# Patient Record
Sex: FEMALE | Race: OTHER | NOT HISPANIC OR LATINO
[De-identification: names, ages, dates, MRNs, and addresses within clinical notes are randomized per-mention and may not be internally consistent; named-entity substitution may affect disease eponyms.]

---

## 2022-03-10 ENCOUNTER — NON-APPOINTMENT (OUTPATIENT)
Age: 62
End: 2022-03-10

## 2022-03-24 PROBLEM — Z00.00 ENCOUNTER FOR PREVENTIVE HEALTH EXAMINATION: Status: ACTIVE | Noted: 2022-03-24

## 2022-04-05 DIAGNOSIS — M25.561 PAIN IN RIGHT KNEE: ICD-10-CM

## 2022-04-05 DIAGNOSIS — M25.562 PAIN IN RIGHT KNEE: ICD-10-CM

## 2022-04-06 ENCOUNTER — APPOINTMENT (OUTPATIENT)
Dept: ORTHOPEDIC SURGERY | Facility: CLINIC | Age: 62
End: 2022-04-06
Payer: COMMERCIAL

## 2022-04-06 ENCOUNTER — NON-APPOINTMENT (OUTPATIENT)
Age: 62
End: 2022-04-06

## 2022-04-06 VITALS — BODY MASS INDEX: 30.61 KG/M2 | HEIGHT: 67 IN | WEIGHT: 195 LBS

## 2022-04-06 PROCEDURE — 73564 X-RAY EXAM KNEE 4 OR MORE: CPT | Mod: LT

## 2022-04-06 PROCEDURE — 99203 OFFICE O/P NEW LOW 30 MIN: CPT

## 2022-04-06 NOTE — ADDENDUM
[FreeTextEntry1] : This note was written by Meghan Alarcon on 04/06/2022 acting as scribe for Dr. Erich Gramajo M.D.\par \par I, Dr. Erich Gramajo, have read and attest that all the information, medical decision making and discharge instructions within are true and accurate.

## 2022-04-06 NOTE — DISCUSSION/SUMMARY
[de-identified] : Discussed at length the nature of the patient’s condition. Their bilateral knee symptoms appear secondary to mild degenerative arthritis. Pt states that her symptoms have been improving. I reviewed her xray films with her. I suggested home exercise and to continue with ibuprofen. Patient can continue activities as tolerated. All questions answered, understanding verbalized. Patient in agreement with plan of care.\par \par I did refer her to Dr. Baker for evaluation of her left foot and ankle.

## 2022-04-06 NOTE — HISTORY OF PRESENT ILLNESS
[de-identified] : ALVARADO TUCKER is a 61 year old female who presents for initial evaluation of bilateral knee pain, worse on the right. Pt states that it started about 2 months ago on the right side, no injury. Pain is diffuse and she noticed some swelling. Denies any mechanical symptoms. The pain on the left has started in the last couple of weeks. Takes ibuprofen prn with relief. States the pain had gotten significantly better. Denies any prior surgery, PT or injections. NKDA. Allergic to Latex.

## 2022-04-06 NOTE — PHYSICAL EXAM
[de-identified] : General appearance: well nourished and hydrated, pleasant, alert and oriented x 3, cooperative.\par HEENT: Normocephalic, EOM intact, Nasal septum midline, Oral cavity clear, External auditory canal clear.\par Cardiovascular: no apparent abnormalities, no lower leg edema, no varicosities, pedal pulses are palpable.\par Lymphatics Lymph nodes: none palpated, Lymphedema: not present.\par Neurologic: sensation is normal, no muscle weakness in upper or lower extremities, patella tendon reflexes intact .\par Dermatologic no apparent skin lesions, moist, warm, no rash.\par Spine:cervical spine appears normal and moves freely, thoracic spine appears normal and moves freely, lumbosacral spine appears normal and moves freely.\par Gait: nonantalgic.\par \par Left knee\par Inspection: no effusion or erythema.\par Wounds: none.\par Alignment: normal.\par Palpation: no specific tenderness on palpation.\par ROM active (in degrees): 0-135 with mild patellofemoral clicking\par Ligamentous laxity: all ligaments appear stable,, negative ant. drawer test, negative post. drawer test, stable to varus stress test, stable to valgus stress test. negative Lachman's test, negative pivot shift test\par Meniscal Test: negative McMurrays, negative Lino.\par Patellofemoral Alignment Test: Q angle-, normal.\par Muscle Test: good quad strength.\par \par Right knee\par Inspection: no effusion or erythema.\par Wounds: none.\par Alignment: normal.\par Palpation: no specific tenderness on palpation.\par ROM active (in degrees): 0-135 with mild patellofemoral clicking\par Ligamentous laxity: all ligaments appear stable,, negative ant. drawer test, negative post. drawer test, stable to varus stress test, stable to valgus stress test. negative Lachman's test, negative pivot shift test\par Meniscal Test: negative McMurrays, negative Lino.\par Patellofemoral Alignment Test: Q angle-, normal.\par Muscle Test: good quad strength.\par \par Left hip\par Inspection: No swelling or ecchymosis.\par Wounds: none.\par Palpation: non-tender.\par Stability: no instability.\par Strength: 5/5 all motor groups.\par ROM: no pain with FROM.\par Leg length: equal.\par \par Right hip\par Inspection: No swelling or ecchymosis.\par Wounds: none.\par Palpation: non-tender.\par Stability: no instability.\par Strength: 5/5 all motor groups.\par ROM: no pain with FROM.\par Leg length: equal.\par \par Left ankle\par Inspection: no erythema noted, no swelling noted.\par Palpation: no pain on palpation .\par ROM: FROM without crepitus.\par Muscle strength: 5/5.\par Stability: no instability noted.\par \par Right ankle\par Inspection: no erythema noted, no swelling noted.\par ROM: FROM without crepitus.\par Palpation: no pain on palpation .\par Muscle strength: 5/5.\par Stability: no instability noted.\par \par Left foot\par Inspection: color, texture and turgor are normal.\par ROM: full range of motion of all joints without pain or crepitus.\par Palpation: no tenderness.\par Stability: no instability noted.\par \par Right foot\par Inspection: color, texture and turgor are normal.\par ROM: full range of motion of all joints without pain or crepitus.\par Palpation: no tenderness.\par Stability: no instability noted.\par \par Left shoulder\par Inspection: no muscle asymmetry, no atrophy.\par Palpation: no tenderness noted, ACJ non-tender.\par ROM: full active ROM, full passive ROM.\par Strength testing): anterior deltoid, supraspinatus, infraspinatus, subscapularis all 5/5.\par Stability test: ant. apprehension negative, post. apprehension negative, relocation test negative.\par Impingement Test: negative NEER.\par \par Right shoulder\par Inspection: no muscle asymmetry, no atrophy.\par Palpation: no tenderness noted, ACJ non-tender.\par ROM: full active ROM, full passive ROM.\par Strength testing): anterior deltoid, supraspinatus, infraspinatus, subscapularis all 5/5.\par Stability test: ant. apprehension negative, post. apprehension negative, relocation test negative.\par Impingement Test: negative NEER.\par Surgical Wounds: none.\par \par Left elbow\par Inspection: negative swelling.\par Wounds: none.\par Palpation: non-tender.\par ROM: full ROM.\par Strength: 5/5 all groups.\par Stability: no instability.\par Mass: none.\par \par Right elbow\par Inspection: negative swelling.\par Wounds: none.\par Palpation: non-tender.\par ROM: full ROM.\par Strength: 5/5 all groups.\par Stability: no instability.\par Mass: none.\par \par Left wrist\par Inspection: negative swelling.\par Wound: none.\par Palpation (bone): no tenderness.\par ROM: full ROM.\par Strength: full , good.\par \par Right wrist\par Inspection: negative swelling.\par Wound: none.\par Palpation (bone): no tenderness.\par ROM: full ROM.\par Strength: full , good.\par \par Left hand\par Inspection: no skin changes, normal appearance.\par Wounds: none.\par Strength: full , able to make full fist.\par Sensation: light touch intact all fingers and thumb.\par Vascular: good capillary refill < 3 seconds, all fingers and thumb.\par Mass: none.\par \par Right hand\par Inspection: no skin changes, normal appearance.\par Wounds: none.\par Strength: full , able to make full fist.\par Sensation: light touch intact all fingers and thumb.\par Vascular: good capillary refill < 3 seconds, all fingers and thumb.\par Mass: none. [de-identified] : Right knee xrays, standing AP/Lateral and Merchant films, and 45 degree PA standing view, taken at the office today shows normal alignment, degenerative arthritis, decreased medial joint space height, Kellgren and Corey grade 1\par \par Left knee xrays, standing AP/Lateral and Merchant films, and 45 degree PA standing view, taken at the office today shows normal alignment, degenerative arthritis, decreased medial joint space height, Kellgren and Corey grade 1

## 2022-04-08 ENCOUNTER — APPOINTMENT (OUTPATIENT)
Dept: ORTHOPEDIC SURGERY | Facility: CLINIC | Age: 62
End: 2022-04-08
Payer: COMMERCIAL

## 2022-04-08 VITALS — BODY MASS INDEX: 30.61 KG/M2 | WEIGHT: 195 LBS | HEIGHT: 67 IN

## 2022-04-08 PROCEDURE — 73600 X-RAY EXAM OF ANKLE: CPT | Mod: LT

## 2022-04-08 PROCEDURE — 73630 X-RAY EXAM OF FOOT: CPT | Mod: LT

## 2022-04-08 PROCEDURE — 99214 OFFICE O/P EST MOD 30 MIN: CPT

## 2022-09-13 ENCOUNTER — APPOINTMENT (OUTPATIENT)
Dept: ORTHOPEDIC SURGERY | Facility: CLINIC | Age: 62
End: 2022-09-13

## 2022-09-13 ENCOUNTER — TRANSCRIPTION ENCOUNTER (OUTPATIENT)
Age: 62
End: 2022-09-13

## 2022-09-13 VITALS — HEIGHT: 67 IN | WEIGHT: 190 LBS | BODY MASS INDEX: 29.82 KG/M2

## 2022-09-13 DIAGNOSIS — M62.89 OTHER SPECIFIED DISORDERS OF MUSCLE: ICD-10-CM

## 2022-09-13 PROCEDURE — 99213 OFFICE O/P EST LOW 20 MIN: CPT

## 2022-10-07 ENCOUNTER — APPOINTMENT (OUTPATIENT)
Dept: MRI IMAGING | Facility: CLINIC | Age: 62
End: 2022-10-07

## 2022-10-07 ENCOUNTER — OUTPATIENT (OUTPATIENT)
Dept: OUTPATIENT SERVICES | Facility: HOSPITAL | Age: 62
LOS: 1 days | End: 2022-10-07
Payer: COMMERCIAL

## 2022-10-07 DIAGNOSIS — M62.89 OTHER SPECIFIED DISORDERS OF MUSCLE: ICD-10-CM

## 2022-10-07 PROCEDURE — 73721 MRI JNT OF LWR EXTRE W/O DYE: CPT

## 2022-10-07 PROCEDURE — 73721 MRI JNT OF LWR EXTRE W/O DYE: CPT | Mod: 26,LT

## 2022-10-27 DIAGNOSIS — S99.919A UNSPECIFIED INJURY OF UNSPECIFIED ANKLE, INITIAL ENCOUNTER: ICD-10-CM

## 2022-10-28 ENCOUNTER — APPOINTMENT (OUTPATIENT)
Dept: ORTHOPEDIC SURGERY | Facility: CLINIC | Age: 62
End: 2022-10-28

## 2022-10-28 VITALS — WEIGHT: 190 LBS | BODY MASS INDEX: 29.82 KG/M2 | HEIGHT: 67 IN

## 2022-10-28 PROCEDURE — 99214 OFFICE O/P EST MOD 30 MIN: CPT

## 2022-10-28 PROCEDURE — 73610 X-RAY EXAM OF ANKLE: CPT | Mod: LT

## 2022-10-30 ENCOUNTER — NON-APPOINTMENT (OUTPATIENT)
Age: 62
End: 2022-10-30

## 2022-11-01 ENCOUNTER — OUTPATIENT (OUTPATIENT)
Dept: OUTPATIENT SERVICES | Facility: HOSPITAL | Age: 62
LOS: 1 days | End: 2022-11-01

## 2022-11-01 ENCOUNTER — APPOINTMENT (OUTPATIENT)
Dept: ULTRASOUND IMAGING | Facility: CLINIC | Age: 62
End: 2022-11-01

## 2022-11-01 ENCOUNTER — NON-APPOINTMENT (OUTPATIENT)
Age: 62
End: 2022-11-01

## 2022-11-01 VITALS
HEART RATE: 68 BPM | WEIGHT: 190.04 LBS | TEMPERATURE: 97 F | DIASTOLIC BLOOD PRESSURE: 68 MMHG | HEIGHT: 66.5 IN | OXYGEN SATURATION: 97 % | SYSTOLIC BLOOD PRESSURE: 104 MMHG | RESPIRATION RATE: 16 BRPM

## 2022-11-01 DIAGNOSIS — Z98.890 OTHER SPECIFIED POSTPROCEDURAL STATES: Chronic | ICD-10-CM

## 2022-11-01 DIAGNOSIS — G47.33 OBSTRUCTIVE SLEEP APNEA (ADULT) (PEDIATRIC): ICD-10-CM

## 2022-11-01 DIAGNOSIS — S82.892A OTHER FRACTURE OF LEFT LOWER LEG, INITIAL ENCOUNTER FOR CLOSED FRACTURE: ICD-10-CM

## 2022-11-01 DIAGNOSIS — Z98.891 HISTORY OF UTERINE SCAR FROM PREVIOUS SURGERY: Chronic | ICD-10-CM

## 2022-11-01 LAB
ANION GAP SERPL CALC-SCNC: 12 MMOL/L — SIGNIFICANT CHANGE UP (ref 7–14)
BUN SERPL-MCNC: 21 MG/DL — SIGNIFICANT CHANGE UP (ref 7–23)
CALCIUM SERPL-MCNC: 9.3 MG/DL — SIGNIFICANT CHANGE UP (ref 8.4–10.5)
CHLORIDE SERPL-SCNC: 104 MMOL/L — SIGNIFICANT CHANGE UP (ref 98–107)
CO2 SERPL-SCNC: 24 MMOL/L — SIGNIFICANT CHANGE UP (ref 22–31)
CREAT SERPL-MCNC: 0.94 MG/DL — SIGNIFICANT CHANGE UP (ref 0.5–1.3)
EGFR: 69 ML/MIN/1.73M2 — SIGNIFICANT CHANGE UP
GLUCOSE SERPL-MCNC: 93 MG/DL — SIGNIFICANT CHANGE UP (ref 70–99)
HCT VFR BLD CALC: 37.3 % — SIGNIFICANT CHANGE UP (ref 34.5–45)
HGB BLD-MCNC: 12.2 G/DL — SIGNIFICANT CHANGE UP (ref 11.5–15.5)
MCHC RBC-ENTMCNC: 29.5 PG — SIGNIFICANT CHANGE UP (ref 27–34)
MCHC RBC-ENTMCNC: 32.7 GM/DL — SIGNIFICANT CHANGE UP (ref 32–36)
MCV RBC AUTO: 90.3 FL — SIGNIFICANT CHANGE UP (ref 80–100)
NRBC # BLD: 0 /100 WBCS — SIGNIFICANT CHANGE UP (ref 0–0)
NRBC # FLD: 0 K/UL — SIGNIFICANT CHANGE UP (ref 0–0)
PLATELET # BLD AUTO: 293 K/UL — SIGNIFICANT CHANGE UP (ref 150–400)
POTASSIUM SERPL-MCNC: 4.3 MMOL/L — SIGNIFICANT CHANGE UP (ref 3.5–5.3)
POTASSIUM SERPL-SCNC: 4.3 MMOL/L — SIGNIFICANT CHANGE UP (ref 3.5–5.3)
RBC # BLD: 4.13 M/UL — SIGNIFICANT CHANGE UP (ref 3.8–5.2)
RBC # FLD: 13.6 % — SIGNIFICANT CHANGE UP (ref 10.3–14.5)
SODIUM SERPL-SCNC: 140 MMOL/L — SIGNIFICANT CHANGE UP (ref 135–145)
WBC # BLD: 7.41 K/UL — SIGNIFICANT CHANGE UP (ref 3.8–10.5)
WBC # FLD AUTO: 7.41 K/UL — SIGNIFICANT CHANGE UP (ref 3.8–10.5)

## 2022-11-01 PROCEDURE — 93010 ELECTROCARDIOGRAM REPORT: CPT

## 2022-11-01 PROCEDURE — 93971 EXTREMITY STUDY: CPT | Mod: LT

## 2022-11-01 RX ORDER — SODIUM CHLORIDE 9 MG/ML
1000 INJECTION, SOLUTION INTRAVENOUS
Refills: 0 | Status: DISCONTINUED | OUTPATIENT
Start: 2022-11-03 | End: 2022-11-17

## 2022-11-01 NOTE — H&P PST ADULT - ATTENDING COMMENTS
Patient examined in holding area without KI which she mentioned was discontinued by knee specialist.

## 2022-11-01 NOTE — H&P PST ADULT - MUSCULOSKELETAL COMMENTS
left ankle fracture and dislocated patella left ankle fracture and dislocated patella.  Extremity: STS / tenderness L ankle, skin intact L LE

## 2022-11-01 NOTE — H&P PST ADULT - HISTORY OF PRESENT ILLNESS
62 y/o female PMH migraine headaches and mild CURTIS presents to presurgical testing with diagnosis of other fracture of left lower leg initial encounter scheduled for open reduction internal fixation left ankle fracture potential tight syndesmotic repair. Pt sustained a left ankle fracture and dislocated her patella after a fall.

## 2022-11-01 NOTE — H&P PST ADULT - DP PULSE
How Severe Is Your Skin Lesion?: moderate Has Your Skin Lesion Been Treated?: not been treated Is This A New Presentation, Or A Follow-Up?: Skin Lesion Is This A New Presentation, Or A Follow-Up?: Growth left unable to asses due to splint/right normal

## 2022-11-01 NOTE — H&P PST ADULT - PROBLEM SELECTOR PLAN 1
Patient tentatively scheduled for open reduction internal fixation left ankle fracture potential tight syndesmotic repair for 11/3/22. Pre-op instructions provided. Pt given verbal and written instructions with teach back on chlorhexidine shampoo and pepcid. Pt verbalized understanding with return demonstration.     COVID-19 PCR non detected - result 10/31/22 in chart    Pending copy of echo and stress  test from 2021.

## 2022-11-01 NOTE — H&P PST ADULT - MUSCULOSKELETAL
left lower leg with cast and splint, uses crutches/decreased ROM due to pain/strength 5/5 bilateral upper extremities/extremities exam/decreased strength/abnormal gait details…

## 2022-11-02 ENCOUNTER — TRANSCRIPTION ENCOUNTER (OUTPATIENT)
Age: 62
End: 2022-11-02

## 2022-11-02 ENCOUNTER — APPOINTMENT (OUTPATIENT)
Dept: ORTHOPEDIC SURGERY | Facility: CLINIC | Age: 62
End: 2022-11-02

## 2022-11-02 VITALS — BODY MASS INDEX: 29.82 KG/M2 | WEIGHT: 190 LBS | HEIGHT: 67 IN

## 2022-11-02 PROBLEM — Z87.81 PERSONAL HISTORY OF (HEALED) TRAUMATIC FRACTURE: Chronic | Status: ACTIVE | Noted: 2022-11-01

## 2022-11-02 PROBLEM — G47.33 OBSTRUCTIVE SLEEP APNEA (ADULT) (PEDIATRIC): Chronic | Status: ACTIVE | Noted: 2022-11-01

## 2022-11-02 PROBLEM — G43.909 MIGRAINE, UNSPECIFIED, NOT INTRACTABLE, WITHOUT STATUS MIGRAINOSUS: Chronic | Status: ACTIVE | Noted: 2022-11-01

## 2022-11-02 PROCEDURE — 99214 OFFICE O/P EST MOD 30 MIN: CPT

## 2022-11-02 PROCEDURE — 73560 X-RAY EXAM OF KNEE 1 OR 2: CPT | Mod: 50

## 2022-11-02 NOTE — ASU PATIENT PROFILE, ADULT - FALL HARM RISK - HARM RISK INTERVENTIONS

## 2022-11-03 ENCOUNTER — OUTPATIENT (OUTPATIENT)
Dept: OUTPATIENT SERVICES | Facility: HOSPITAL | Age: 62
LOS: 1 days | Discharge: HOME | End: 2022-11-03

## 2022-11-03 ENCOUNTER — APPOINTMENT (OUTPATIENT)
Dept: ORTHOPEDIC SURGERY | Facility: HOSPITAL | Age: 62
End: 2022-11-03

## 2022-11-03 ENCOUNTER — TRANSCRIPTION ENCOUNTER (OUTPATIENT)
Age: 62
End: 2022-11-03

## 2022-11-03 VITALS
OXYGEN SATURATION: 98 % | HEART RATE: 73 BPM | RESPIRATION RATE: 18 BRPM | DIASTOLIC BLOOD PRESSURE: 55 MMHG | TEMPERATURE: 98 F | SYSTOLIC BLOOD PRESSURE: 124 MMHG

## 2022-11-03 VITALS
TEMPERATURE: 98 F | HEIGHT: 66.5 IN | OXYGEN SATURATION: 97 % | SYSTOLIC BLOOD PRESSURE: 126 MMHG | WEIGHT: 190.04 LBS | RESPIRATION RATE: 16 BRPM | HEART RATE: 66 BPM | DIASTOLIC BLOOD PRESSURE: 67 MMHG

## 2022-11-03 DIAGNOSIS — Z98.890 OTHER SPECIFIED POSTPROCEDURAL STATES: Chronic | ICD-10-CM

## 2022-11-03 DIAGNOSIS — S82.892A OTHER FRACTURE OF LEFT LOWER LEG, INITIAL ENCOUNTER FOR CLOSED FRACTURE: ICD-10-CM

## 2022-11-03 DIAGNOSIS — Z98.891 HISTORY OF UTERINE SCAR FROM PREVIOUS SURGERY: Chronic | ICD-10-CM

## 2022-11-03 PROCEDURE — 27814 TREATMENT OF ANKLE FRACTURE: CPT | Mod: LT

## 2022-11-03 DEVICE — SCREW LOGK 3.5X22MM: Type: IMPLANTABLE DEVICE | Status: FUNCTIONAL

## 2022-11-03 DEVICE — SCREW LOKG 3.5X14MM: Type: IMPLANTABLE DEVICE | Status: FUNCTIONAL

## 2022-11-03 DEVICE — IMPLANTABLE DEVICE: Type: IMPLANTABLE DEVICE | Status: FUNCTIONAL

## 2022-11-03 DEVICE — K-WIRE WRIGHT MEDICAL (SINGLE TROCAR) 1.6MM X 150MM: Type: IMPLANTABLE DEVICE | Status: FUNCTIONAL

## 2022-11-03 DEVICE — SCREW LOKG 3.5X12MM: Type: IMPLANTABLE DEVICE | Status: FUNCTIONAL

## 2022-11-03 DEVICE — SCREW LOKG 3.5X32MM: Type: IMPLANTABLE DEVICE | Status: FUNCTIONAL

## 2022-11-03 DEVICE — SCREW LCKG 3.5X24MM: Type: IMPLANTABLE DEVICE | Status: FUNCTIONAL

## 2022-11-03 DEVICE — SCREW CANC 4X30MM: Type: IMPLANTABLE DEVICE | Status: FUNCTIONAL

## 2022-11-03 DEVICE — SCREW LOKG 3.5X30MM: Type: IMPLANTABLE DEVICE | Status: FUNCTIONAL

## 2022-11-03 DEVICE — SCREW CORT LO PROF 3.5X14MM: Type: IMPLANTABLE DEVICE | Status: FUNCTIONAL

## 2022-11-03 DEVICE — PIN FIXATION TEMP 1.1MM SM: Type: IMPLANTABLE DEVICE | Status: FUNCTIONAL

## 2022-11-03 RX ORDER — ASPIRIN/CALCIUM CARB/MAGNESIUM 324 MG
1 TABLET ORAL
Qty: 28 | Refills: 0
Start: 2022-11-03 | End: 2022-11-30

## 2022-11-03 RX ORDER — OXYCODONE HYDROCHLORIDE 5 MG/1
5 TABLET ORAL ONCE
Refills: 0 | Status: DISCONTINUED | OUTPATIENT
Start: 2022-11-03 | End: 2022-11-03

## 2022-11-03 RX ORDER — OXYCODONE HYDROCHLORIDE 5 MG/1
5 TABLET ORAL ONCE
Refills: 0 | Status: DISCONTINUED | OUTPATIENT
Start: 2022-11-03 | End: 2022-11-17

## 2022-11-03 RX ORDER — ONDANSETRON 8 MG/1
4 TABLET, FILM COATED ORAL ONCE
Refills: 0 | Status: COMPLETED | OUTPATIENT
Start: 2022-11-03 | End: 2022-11-03

## 2022-11-03 RX ORDER — FENTANYL CITRATE 50 UG/ML
50 INJECTION INTRAVENOUS
Refills: 0 | Status: DISCONTINUED | OUTPATIENT
Start: 2022-11-03 | End: 2022-11-03

## 2022-11-03 RX ORDER — OXYCODONE HYDROCHLORIDE 5 MG/1
1 TABLET ORAL
Qty: 0 | Refills: 0 | DISCHARGE

## 2022-11-03 RX ORDER — IBUPROFEN 200 MG
1 TABLET ORAL
Qty: 0 | Refills: 0 | DISCHARGE

## 2022-11-03 RX ORDER — HYDROMORPHONE HYDROCHLORIDE 2 MG/ML
0.5 INJECTION INTRAMUSCULAR; INTRAVENOUS; SUBCUTANEOUS
Refills: 0 | Status: DISCONTINUED | OUTPATIENT
Start: 2022-11-03 | End: 2022-11-03

## 2022-11-03 RX ORDER — KETOROLAC TROMETHAMINE 30 MG/ML
30 SYRINGE (ML) INJECTION ONCE
Refills: 0 | Status: DISCONTINUED | OUTPATIENT
Start: 2022-11-03 | End: 2022-11-03

## 2022-11-03 RX ADMIN — Medication 30 MILLIGRAM(S): at 17:43

## 2022-11-03 RX ADMIN — OXYCODONE HYDROCHLORIDE 5 MILLIGRAM(S): 5 TABLET ORAL at 17:32

## 2022-11-03 RX ADMIN — HYDROMORPHONE HYDROCHLORIDE 0.5 MILLIGRAM(S): 2 INJECTION INTRAMUSCULAR; INTRAVENOUS; SUBCUTANEOUS at 15:46

## 2022-11-03 RX ADMIN — HYDROMORPHONE HYDROCHLORIDE 0.5 MILLIGRAM(S): 2 INJECTION INTRAMUSCULAR; INTRAVENOUS; SUBCUTANEOUS at 16:38

## 2022-11-03 RX ADMIN — HYDROMORPHONE HYDROCHLORIDE 0.5 MILLIGRAM(S): 2 INJECTION INTRAMUSCULAR; INTRAVENOUS; SUBCUTANEOUS at 16:15

## 2022-11-03 RX ADMIN — HYDROMORPHONE HYDROCHLORIDE 0.5 MILLIGRAM(S): 2 INJECTION INTRAMUSCULAR; INTRAVENOUS; SUBCUTANEOUS at 17:30

## 2022-11-03 RX ADMIN — HYDROMORPHONE HYDROCHLORIDE 0.5 MILLIGRAM(S): 2 INJECTION INTRAMUSCULAR; INTRAVENOUS; SUBCUTANEOUS at 18:00

## 2022-11-03 RX ADMIN — OXYCODONE HYDROCHLORIDE 5 MILLIGRAM(S): 5 TABLET ORAL at 18:15

## 2022-11-03 RX ADMIN — ONDANSETRON 4 MILLIGRAM(S): 8 TABLET, FILM COATED ORAL at 17:31

## 2022-11-03 RX ADMIN — Medication 30 MILLIGRAM(S): at 18:00

## 2022-11-03 NOTE — ASU PREOP CHECKLIST - SITE MARKED BY ANESTHESIOLOGIST
- Apply cream to affected area as prescribed, do not use steroid cream on skin continuously for more than 2 week as this may thin out the skin  - Make sure to cover entire body surface with non-perfumed lotion such as aveeno or eucerin cream within 5 minutes of getting out of the shower  - Avoid harsh perfumed soaps and detergents   - Avoid scratching skin. If skin extremely itchy may take benadryl or use cool compress to affected area  - Follow up with primary care provider if no improvement in symptoms   - Go to the emergency department for any worsening or concerning symptoms            
n/a

## 2022-11-03 NOTE — ASU DISCHARGE PLAN (ADULT/PEDIATRIC) - CALL YOUR DOCTOR IF YOU HAVE ANY OF THE FOLLOWING:
Bleeding that does not stop/Swelling that gets worse/Pain not relieved by Medications Bleeding that does not stop/Swelling that gets worse/Pain not relieved by Medications/Nausea and vomiting that does not stop/Inability to tolerate liquids or foods

## 2022-11-03 NOTE — ASU DISCHARGE PLAN (ADULT/PEDIATRIC) - FOLLOW UP APPOINTMENTS
472 May also call Recovery Room (PACU) 24/7 @ [phone]/[facility]

## 2022-11-03 NOTE — ASU DISCHARGE PLAN (ADULT/PEDIATRIC) - CARE PROVIDER_API CALL
Ronny Baker  Orthopedic Surgery  [address]  [place] [address]  Phone: [phone]  Fax: [phone]  Follow Up Time: 2 weeks

## 2022-11-03 NOTE — ASU DISCHARGE PLAN (ADULT/PEDIATRIC) - NS MD DC FALL RISK RISK
For information on Fall & Injury Prevention, visit: [email or web address] OR  [email or web address] OR  https://www.cdc.gov/steadi/patient.html

## 2022-11-03 NOTE — ASU DISCHARGE PLAN (ADULT/PEDIATRIC) - NURSING INSTRUCTIONS
You were given 1000mg IV Tylenol for pain management.  Please DO NOT take any Tylenol containing products, such as  Vicodin, Percocet, Excedrin, many cold preparations for the next 6 hours (until  __9_ PM).  DO NOT EXCEED 4000MG OF TYLENOL OVER 24 HOURS.     DO NOT take any Ibuprofen ,Advil or Aleeve until after  __1:45__AM . You received Toradol during your operation and it can cause damage if too much is taken within a 24 hour time period.

## 2022-11-03 NOTE — ASU DISCHARGE PLAN (ADULT/PEDIATRIC) - ASU DC SPECIAL INSTRUCTIONSFT
You will take pain medication as prescribed Narcotic pain medicine can cause extreme nausea and constipation. Drink plenty of water and take diuretics (colace, Miralax) as needed. You can get them from your local pharmacy.     You will take Aspirin 325mg once a day.     You will remain non weight bearing of your left lower extremity in a cast. You will remain in a cast until you follow up with Dr. Baker    You will follow up with Dr Baker in 2 weeks You will take pain medication as prescribed Narcotic pain medicine can cause extreme nausea and constipation. Drink plenty of water and take stool softeners (colace, Miralax) as needed. You can get them from your local pharmacy.     You will take Aspirin 325mg once a day.     You will remain non weight bearing of your left lower extremity in a cast. You will remain in a cast until you follow up with Dr. Baker    You will follow up with Dr Baker in 2 weeks

## 2022-11-03 NOTE — BRIEF OPERATIVE NOTE - NSICDXBRIEFPROCEDURE_GEN_ALL_CORE_FT
PROCEDURES:  Open reduction and internal fixation (ORIF) of bimalleolar fracture of left ankle 03-Nov-2022 15:57:04  Toni Davis

## 2022-11-12 LAB — SARS-COV-2 N GENE NPH QL NAA+PROBE: NOT DETECTED

## 2022-11-16 ENCOUNTER — NON-APPOINTMENT (OUTPATIENT)
Age: 62
End: 2022-11-16

## 2022-11-16 ENCOUNTER — APPOINTMENT (OUTPATIENT)
Dept: ORTHOPEDIC SURGERY | Facility: CLINIC | Age: 62
End: 2022-11-16

## 2022-11-16 PROCEDURE — 73610 X-RAY EXAM OF ANKLE: CPT | Mod: LT

## 2022-11-16 PROCEDURE — 99024 POSTOP FOLLOW-UP VISIT: CPT

## 2022-11-21 ENCOUNTER — APPOINTMENT (OUTPATIENT)
Dept: ORTHOPEDIC SURGERY | Facility: CLINIC | Age: 62
End: 2022-11-21

## 2022-11-21 PROCEDURE — 99213 OFFICE O/P EST LOW 20 MIN: CPT

## 2022-11-21 NOTE — DISCUSSION/SUMMARY
[de-identified] : Left knee patella dislocation still with pain, have ordered a MRI left knee to assess the retinaculum and see ifg there are any other associated injuries. At this time cannot start PT since she is recovering from left foot surgery and is NWB in CAM boot, she states that she mat start PT in 2 weeks if cleared by Dr Baker\titi Once results available will discuss further RASH/ITCH

## 2022-11-21 NOTE — PHYSICAL EXAM
[de-identified] : Left knee\par Inspection: trace effusion.\par Wounds: none.\par Alignment: normal.\par Palpation: medial retinaculum tenderness. medial and latreal joint pain \par ROM active (in degrees): 0-120 with pain at extremes, patella tracking in central position, mild patella apprehension with lateral deviation. \par Ligamentous laxity: all ligaments appear stable, negative Lachman's test, negative ant. drawer test, negative post. drawer test, negative pivot shift test, stable to varus stress test, stable to valgus stress test.\par Meniscal Test: negative McMurrays, negative Lino.\par Patellofemoral Alignment Test: Q angle-, normal.\par Muscle Test: good quad strength.\par \par Presented in a wheelchair today with left CAM walker boot, nonweight bearing

## 2022-11-21 NOTE — HISTORY OF PRESENT ILLNESS
[de-identified] : ALVARADO TUCKER is a 62 year old female who presents for follow up evaluation s/p left knee patella subluxation. Patient underwent surgery on 11/3/22 with Dr Baker for an ORIF of her left ankle fracture. Patient saw Dr Baker for her post-operative visit last week and was switched from a short leg cast to a Cam boot. Patient is also non-weight bearing and continues to use her wheelchair or crutches. Patient was recommended to use a rolling scooter but would like a follow-up evaluation for the left knee to determine if this is safe. Patient continues to experience pain and swelling of the left knee. She has been taking Percocet as needed post operatively for pain.

## 2022-11-30 ENCOUNTER — APPOINTMENT (OUTPATIENT)
Dept: ORTHOPEDIC SURGERY | Facility: CLINIC | Age: 62
End: 2022-11-30

## 2022-11-30 ENCOUNTER — APPOINTMENT (OUTPATIENT)
Dept: MRI IMAGING | Facility: CLINIC | Age: 62
End: 2022-11-30

## 2022-11-30 ENCOUNTER — OUTPATIENT (OUTPATIENT)
Dept: OUTPATIENT SERVICES | Facility: HOSPITAL | Age: 62
LOS: 1 days | End: 2022-11-30

## 2022-11-30 ENCOUNTER — OUTPATIENT (OUTPATIENT)
Dept: OUTPATIENT SERVICES | Facility: HOSPITAL | Age: 62
LOS: 1 days | End: 2022-11-30
Payer: COMMERCIAL

## 2022-11-30 DIAGNOSIS — Z98.891 HISTORY OF UTERINE SCAR FROM PREVIOUS SURGERY: Chronic | ICD-10-CM

## 2022-11-30 DIAGNOSIS — S83.006A UNSPECIFIED DISLOCATION OF UNSPECIFIED PATELLA, INITIAL ENCOUNTER: ICD-10-CM

## 2022-11-30 DIAGNOSIS — Z00.8 ENCOUNTER FOR OTHER GENERAL EXAMINATION: ICD-10-CM

## 2022-11-30 DIAGNOSIS — Z98.890 OTHER SPECIFIED POSTPROCEDURAL STATES: Chronic | ICD-10-CM

## 2022-11-30 PROCEDURE — 73721 MRI JNT OF LWR EXTRE W/O DYE: CPT | Mod: 26,LT

## 2022-11-30 PROCEDURE — 73721 MRI JNT OF LWR EXTRE W/O DYE: CPT

## 2022-12-02 RX ORDER — ASPIRIN 325 MG/1
325 TABLET, FILM COATED ORAL DAILY
Qty: 7 | Refills: 0 | Status: ACTIVE | COMMUNITY
Start: 2022-12-02 | End: 1900-01-01

## 2022-12-05 ENCOUNTER — APPOINTMENT (OUTPATIENT)
Dept: ORTHOPEDIC SURGERY | Facility: CLINIC | Age: 62
End: 2022-12-05

## 2022-12-05 PROCEDURE — G2012 BRIEF CHECK IN BY MD/QHP: CPT

## 2022-12-06 NOTE — HISTORY OF PRESENT ILLNESS
[de-identified] : ALVARADO TUCKER is a 61 year old female who presents for follow up evaluation of coming in for left knee pain. States that she was straightening some pillows on her bed and may have got her leg caught and dislocated her left knee and ankle - knee went back into place on its own. She went to ED and had xrays taken, she was give a brace for left knee and cast for ankle. She f/u with dr. Baker who took more xrays and was told that she had a fx of the tibia, ORIF tomorrow as well as a possible tight rope syndesmotic repair. She is currently using crutches and is nonweightbearing, using a wheel chair as well. She was given Oxy from the hospital and has been taking Tylenol as well.

## 2022-12-06 NOTE — DISCUSSION/SUMMARY
[de-identified] : Pt comes in today with an acute problem, it sounds as if she had a left patella subluxation. She was dx'd with a left ankle fx and is scheduled for surgery tomorrow with Dr. Baker.\par \par Patella appears to be in a central position tracking normally. Since this is a one time subluxation I discontinued the knee immobilizer and will allow ROM as tolerated.\par \par I will see her back in 3 weeks after her left ankle surgery tomorrow. We will reevaluate the need and consider an MRI to further evaluate the medial retinaculum. \par \par Patient can continue with pain meds as per Dr. Baker and activities as tolerated. All questions answered, understanding verbalized. Patient in agreement with plan of care.

## 2022-12-06 NOTE — ADDENDUM
[FreeTextEntry1] : This note was written by Meghan Alarcon on 11/02/2022 acting as scribe for Dr. Erich Gramajo M.D.\par \par I, Dr. Erich Gramajo, have read and attest that all the information, medical decision making and discharge instructions within are true and accurate.

## 2022-12-06 NOTE — PHYSICAL EXAM
[de-identified] : General appearance: well nourished and hydrated, pleasant, alert and oriented x 3, cooperative.\par HEENT: Normocephalic, EOM intact, Nasal septum midline, Oral cavity clear, External auditory canal clear.\par Cardiovascular: no apparent abnormalities, no lower leg edema, no varicosities, pedal pulses are palpable.\par Lymphatics Lymph nodes: none palpated, Lymphedema: not present.\par Neurologic: sensation is normal, no muscle weakness in upper or lower extremities, patella tendon reflexes intact .\par Dermatologic no apparent skin lesions, moist, warm, no rash.\par Spine:cervical spine appears normal and moves freely, thoracic spine appears normal and moves freely, lumbosacral spine appears normal and moves freely.\par Gait: nonantalgic.\par \par Left Knee\par Inspection: trace effusion\par Wounds: none.\par Alignment: normal.\par Palpation: medial retinaculum tenderness on palpation.\par ROM: Active (in degrees): 0-90 with pain at extremes\par Ligamentous laxity (neg): all ligaments appear stable, negative ant. drawer test, negative post. drawer test, stable to varus stress test, stable to valgus stress test, negative Lachman's test, negative pivot shift test,\par Meniscal Test: negative McMurrays, negative Lino.\par Patellofemoral Alignment Test: patella tracking in central position, mild patella apprehension with lateral deviation \par Muscle Test: good quad strength.\par Leg examination: calf is soft and non-tender. [de-identified] : Left knee xrays AP and lateral taken today demonstrates normal alignment, joint space maintained, patella at appropriate height, no fracture seen

## 2022-12-08 ENCOUNTER — APPOINTMENT (OUTPATIENT)
Dept: ORTHOPEDIC SURGERY | Facility: CLINIC | Age: 62
End: 2022-12-08

## 2022-12-08 ENCOUNTER — NON-APPOINTMENT (OUTPATIENT)
Age: 62
End: 2022-12-08

## 2022-12-08 PROCEDURE — 73610 X-RAY EXAM OF ANKLE: CPT | Mod: LT

## 2022-12-08 PROCEDURE — 99024 POSTOP FOLLOW-UP VISIT: CPT

## 2022-12-12 ENCOUNTER — NON-APPOINTMENT (OUTPATIENT)
Age: 62
End: 2022-12-12

## 2023-01-04 ENCOUNTER — NON-APPOINTMENT (OUTPATIENT)
Age: 63
End: 2023-01-04

## 2023-01-04 ENCOUNTER — APPOINTMENT (OUTPATIENT)
Dept: ORTHOPEDIC SURGERY | Facility: CLINIC | Age: 63
End: 2023-01-04
Payer: COMMERCIAL

## 2023-01-04 PROCEDURE — 99024 POSTOP FOLLOW-UP VISIT: CPT

## 2023-01-04 PROCEDURE — 73610 X-RAY EXAM OF ANKLE: CPT | Mod: LT

## 2023-01-12 DIAGNOSIS — Z98.890 OTHER SPECIFIED POSTPROCEDURAL STATES: ICD-10-CM

## 2023-01-12 RX ORDER — MELOXICAM 7.5 MG/1
7.5 TABLET ORAL
Qty: 30 | Refills: 0 | Status: ACTIVE | COMMUNITY
Start: 2023-01-12 | End: 1900-01-01

## 2023-01-23 ENCOUNTER — APPOINTMENT (OUTPATIENT)
Dept: ORTHOPEDIC SURGERY | Facility: CLINIC | Age: 63
End: 2023-01-23
Payer: COMMERCIAL

## 2023-01-23 PROCEDURE — 99213 OFFICE O/P EST LOW 20 MIN: CPT

## 2023-01-23 NOTE — HISTORY OF PRESENT ILLNESS
[de-identified] : ALVARADO TUCKER is a 62 year old female who presents for follow up evaluation of 2 months s/p left patella dislocation. The MRI discussed showed a contusion and high grade partial tear of the medial patellar retinaculum. She was referred for PT with non weight-bearing exercises. Pt was transitioned by Dr Baker, her ankle specialist, to partial weight-bearing. She has been going to PT twice per week. Mentions still using crutches. She saw Dr. Baker on January 26th to transition to a CAM boot as well as to discuss weight-bearing status. C/o pain and swelling on the left knee, as well as pain during PT. Denies taking any meds.

## 2023-01-23 NOTE — DISCUSSION/SUMMARY
[de-identified] : Patient is making progress with her LEFT patella dislocation. Patient is partially weight baring. She will follow-up with Dr. Baker in regard to her LEFT foot and ankle symptoms.\par \par Patient can continue PT and activities as tolerated. All questions were answered, understanding verbalized. Patient is in agreement with plan of treatment.

## 2023-01-23 NOTE — PHYSICAL EXAM
[de-identified] : General appearance: well nourished and hydrated, pleasant, alert and oriented x 3, cooperative.\par HEENT: Normocephalic, EOM intact, Nasal septum midline, Oral cavity clear, External auditory canal clear.\par Cardiovascular: no apparent abnormalities, no lower leg edema, no varicosities, pedal pulses are palpable.\par Lymphatics Lymph nodes: none palpated, Lymphedema: not present.\par Neurologic: sensation is normal, no muscle weakness in upper or lower extremities, patella tendon reflexes intact .\par Dermatologic no apparent skin lesions, moist, warm, no rash.\par Spine:cervical spine appears normal and moves freely, thoracic spine appears normal and moves freely, lumbosacral spine appears normal and moves freely.\par Gait: nonantalgic. \par \par Left knee\par Inspection: trace effusion\par Wounds: none.\par Alignment: normal.\par Palpation: medial tenderness, patella tracking centrally\par ROM active (in degrees): 0-130\par Ligamentous laxity: all ligaments appear stable, negative Lachman's test, negative ant. drawer test, negative post. drawer test, negative pivot shift test, stable to varus stress test, stable to valgus stress test.\par Meniscal Test: negative McMurrays, negative Lino.\par Patellofemoral Alignment Test: Q angle-, normal.\par Muscle Test: good quad strength.\par \par *Presented with crutches and left CAM walker boot today [de-identified] : no xrays today.\par \par MRI left knee taken 11/30/22; films brought in and reviewed, see attached report on chart.

## 2023-01-23 NOTE — ADDENDUM
[FreeTextEntry1] : This note was written by MIROSLAVA ASHLEY on 01/23/2023 acting as scribe for Dr. Erich Gramajo M.D.\par \par I, Dr. Erich Gramajo, have read and attest that all the information, medical decision making and discharge instructions within are true and accurate. \par \par This note was written by Nicholas Littlejohn on 01/23/2023 acting as scribe for Dr. Erich Gramajo M.D.\par \par CHIVO, Dr. Erich Gramajo, have read and attest that all the information, medical decision making and discharge instructions within are true and accurate.

## 2023-01-26 ENCOUNTER — NON-APPOINTMENT (OUTPATIENT)
Age: 63
End: 2023-01-26

## 2023-01-26 ENCOUNTER — APPOINTMENT (OUTPATIENT)
Dept: ORTHOPEDIC SURGERY | Facility: CLINIC | Age: 63
End: 2023-01-26
Payer: COMMERCIAL

## 2023-01-26 PROCEDURE — 99024 POSTOP FOLLOW-UP VISIT: CPT

## 2023-01-26 PROCEDURE — 73620 X-RAY EXAM OF FOOT: CPT | Mod: LT

## 2023-01-26 PROCEDURE — 73610 X-RAY EXAM OF ANKLE: CPT | Mod: LT

## 2023-02-21 ENCOUNTER — NON-APPOINTMENT (OUTPATIENT)
Age: 63
End: 2023-02-21

## 2023-02-27 ENCOUNTER — APPOINTMENT (OUTPATIENT)
Dept: UROLOGY | Facility: CLINIC | Age: 63
End: 2023-02-27
Payer: COMMERCIAL

## 2023-02-27 ENCOUNTER — NON-APPOINTMENT (OUTPATIENT)
Age: 63
End: 2023-02-27

## 2023-02-27 DIAGNOSIS — R33.9 RETENTION OF URINE, UNSPECIFIED: ICD-10-CM

## 2023-02-27 DIAGNOSIS — Z78.9 OTHER SPECIFIED HEALTH STATUS: ICD-10-CM

## 2023-02-27 DIAGNOSIS — R35.0 FREQUENCY OF MICTURITION: ICD-10-CM

## 2023-02-27 PROCEDURE — 99204 OFFICE O/P NEW MOD 45 MIN: CPT

## 2023-02-27 NOTE — ASSESSMENT
[FreeTextEntry1] : patient with frequency  to void due to increased fluids\par last week states had a lot of fluids and states didn't feel pressure to void \par didn't feel full after void or diff to void, no strain , no INC, no dysuira , no hematuria \par sl constipated recently and thus sl change \par not sexually active \par no abnl vag bleeding\par  ( c section x 1 ) , recen MRI by GI for left LQ pan referred to [facility] back ( 2022) - reviewed: esequiel FITZPATRICK \par states driinyenniferg the same amoutn of fluids - water \par here for further eval of above - feeling that less frequency for the amount of fluids taken :\par 1- check urine \par 2- voidig diary with HAT- provided\par

## 2023-02-27 NOTE — HISTORY OF PRESENT ILLNESS
[FreeTextEntry1] : patient with frequency  to void due to increased fluids\par last week states had a lot of fluids and states didn't feel pressure to void \par didn't feel full after void or diff to void, no strain , no INC, no dysuira , no hematuria \par sl constipated recently and thus sl change \par not sexually active \par no abnl vag bleeding\par  ( c section x 1 ) , recen MRI by GI for left LQ pan referred to [facility] back ( 2022) - reviewed: esequiel FITZPATRICK \par states michelle the same amoutn of fluids - water \par here for further eval of above - feeling that less frequency for the amount of fluids taken :

## 2023-02-27 NOTE — PHYSICAL EXAM
[General Appearance - Well Developed] : well developed [General Appearance - Well Nourished] : well nourished [Normal Appearance] : normal appearance [Well Groomed] : well groomed [General Appearance - In No Acute Distress] : no acute distress [Edema] : no peripheral edema [Respiration, Rhythm And Depth] : normal respiratory rhythm and effort [Exaggerated Use Of Accessory Muscles For Inspiration] : no accessory muscle use [Abdomen Soft] : soft [Abdomen Tenderness] : non-tender [Costovertebral Angle Tenderness] : no ~M costovertebral angle tenderness [FreeTextEntry1] : left ankle injury /surgery - in air cast [] : no rash [No Focal Deficits] : no focal deficits [Oriented To Time, Place, And Person] : oriented to person, place, and time [Affect] : the affect was normal [Mood] : the mood was normal [Not Anxious] : not anxious [No Palpable Adenopathy] : no palpable adenopathy

## 2023-02-27 NOTE — REVIEW OF SYSTEMS
[see HPI] : see HPI [Joint Swelling] : joint swelling [Joint Stiffness] : joint stiffness [Difficulty Walking] : difficulty walking [Negative] : Constitutional

## 2023-03-01 LAB
APPEARANCE: ABNORMAL
BACTERIA: NEGATIVE
BILIRUBIN URINE: NEGATIVE
BLOOD URINE: NEGATIVE
CALCIUM OXALATE CRYSTALS: ABNORMAL
COLOR: YELLOW
GLUCOSE QUALITATIVE U: NEGATIVE
HYALINE CASTS: 0 /LPF
KETONES URINE: NORMAL
LEUKOCYTE ESTERASE URINE: NEGATIVE
MICROSCOPIC-UA: NORMAL
NITRITE URINE: NEGATIVE
PH URINE: 6
PROTEIN URINE: ABNORMAL
RED BLOOD CELLS URINE: 0 /HPF
SPECIFIC GRAVITY URINE: 1.03
SQUAMOUS EPITHELIAL CELLS: 1 /HPF
UROBILINOGEN URINE: NORMAL
WHITE BLOOD CELLS URINE: 1 /HPF

## 2023-03-06 ENCOUNTER — NON-APPOINTMENT (OUTPATIENT)
Age: 63
End: 2023-03-06

## 2023-03-29 ENCOUNTER — APPOINTMENT (OUTPATIENT)
Dept: ORTHOPEDIC SURGERY | Facility: CLINIC | Age: 63
End: 2023-03-29
Payer: COMMERCIAL

## 2023-03-29 DIAGNOSIS — M17.0 BILATERAL PRIMARY OSTEOARTHRITIS OF KNEE: ICD-10-CM

## 2023-03-29 DIAGNOSIS — S83.005A UNSPECIFIED DISLOCATION OF LEFT PATELLA, INITIAL ENCOUNTER: ICD-10-CM

## 2023-03-29 PROCEDURE — 99213 OFFICE O/P EST LOW 20 MIN: CPT

## 2023-03-29 PROCEDURE — 73564 X-RAY EXAM KNEE 4 OR MORE: CPT | Mod: LT

## 2023-03-29 NOTE — DISCUSSION/SUMMARY
[de-identified] : Patient has mild degenerative arthritis in her RIGHT knee. There have been no further episodes of patella subluxation. She also has a small Baker's cyst in her right knee; which I do not believe warrants any intervention at this time. \par \par Patient can continue home exercises and activities as tolerated. All questions were answered, understanding verbalized. Patient is in agreement with plan of treatment. \par \par Patient may follow up with x-rays in 3 months if her Baker's cyst increases in size. If this occurs, we might consider an ultrasound guided aspiration.

## 2023-03-29 NOTE — ADDENDUM
[FreeTextEntry1] : This note was written by Meghan Alarcon on 03/29/2023 acting as scribe for Dr. Erich Gramajo M.D.\par \par I, Dr. Erich Gramajo, have read and attest that all the information, medical decision making and discharge instructions within are true and accurate.\par \par This note was written by Nicholas Littlejohn on 03/29/2023 acting as scribe for Dr. Erich Gramajo M.D.\par \par I, Dr. Erich Gramajo, have read and attest that all the information, medical decision making and discharge instructions within are true and accurate.

## 2023-03-29 NOTE — PHYSICAL EXAM
[de-identified] : General appearance: well nourished and hydrated, pleasant, alert and oriented x 3, cooperative.\par HEENT: Normocephalic, EOM intact, Nasal septum midline, Oral cavity clear, External auditory canal clear.\par Cardiovascular: no apparent abnormalities, no lower leg edema, no varicosities, pedal pulses are palpable.\par Lymphatics Lymph nodes: none palpated, Lymphedema: not present.\par Neurologic: sensation is normal, no muscle weakness in upper or lower extremities, patella tendon reflexes intact .\par Dermatologic no apparent skin lesions, moist, warm, no rash.\par Spine:cervical spine appears normal and moves freely, thoracic spine appears normal and moves freely, lumbosacral spine appears normal and moves freely.\par Gait: nonantalgic.\par \par Left Knee\par Inspection: no effusion or erythema.\par Wounds: none.\par Alignment: normal.\par Palpation: no specific tenderness on palpation.\par ROM: Active (in degrees): 0-130 with peripatellar clicking \par Ligamentous laxity (neg): all ligaments appear stable, negative ant. drawer test, negative post. drawer test, stable to varus stress test, stable to valgus stress test, negative Lachman's test, negative pivot shift test,\par Meniscal Test: negative McMurrays, negative Lino.\par Patellofemoral Alignment Test: Q angle-, normal. patella tracking central and normal qts angle.\par Muscle Test: good quad strength.\par Leg examination: calf is soft and non-tender. [de-identified] : Left knee xrays, standing AP/Lateral and Merchant films, and 45 degree PA standing view, taken at the office today shows normal alignment, slight decrease in medial joint height, patella at appropriate height, generalized osteopenia\par \par Right knee xray merchant view taken at the office today demonstrates good joint space and a well centered patella.\par

## 2023-03-29 NOTE — HISTORY OF PRESENT ILLNESS
[de-identified] : ALVARADO TUCKER is a 62 year old female who presents for follow up evaluation of left patella dislocation in Oct 2022. She has been doing PT since. No further subluxation events. Pain has improved but does report some discomfort. Also notes left ankle fx fixed by Dr. Baker, progressed from camboot to ankle brace.

## 2023-04-13 ENCOUNTER — NON-APPOINTMENT (OUTPATIENT)
Age: 63
End: 2023-04-13

## 2023-04-13 ENCOUNTER — APPOINTMENT (OUTPATIENT)
Dept: ORTHOPEDIC SURGERY | Facility: CLINIC | Age: 63
End: 2023-04-13
Payer: COMMERCIAL

## 2023-04-13 PROCEDURE — 73610 X-RAY EXAM OF ANKLE: CPT | Mod: LT

## 2023-04-13 PROCEDURE — 99213 OFFICE O/P EST LOW 20 MIN: CPT

## 2023-06-26 ENCOUNTER — APPOINTMENT (OUTPATIENT)
Dept: ORTHOPEDIC SURGERY | Facility: CLINIC | Age: 63
End: 2023-06-26
Payer: COMMERCIAL

## 2023-06-26 DIAGNOSIS — S83.006A UNSPECIFIED DISLOCATION OF UNSPECIFIED PATELLA, INITIAL ENCOUNTER: ICD-10-CM

## 2023-06-26 DIAGNOSIS — M71.22 SYNOVIAL CYST OF POPLITEAL SPACE [BAKER], LEFT KNEE: ICD-10-CM

## 2023-06-26 DIAGNOSIS — M54.42 LUMBAGO WITH SCIATICA, LEFT SIDE: ICD-10-CM

## 2023-06-26 PROCEDURE — 99213 OFFICE O/P EST LOW 20 MIN: CPT

## 2023-06-26 PROCEDURE — 73564 X-RAY EXAM KNEE 4 OR MORE: CPT | Mod: LT

## 2023-06-26 NOTE — HISTORY OF PRESENT ILLNESS
[de-identified] : ALVARADO TUCKER is a 62 year old female who presents for follow up evaluation of left knee pain. States since the end of last month she has been having worsening posterior knee pain with an inability to fully extend her knee for long durations due to pain. About a week ago, she woke up with pain in the posterior left knee, and she cannot straighten her leg fully. Pt notes she was unable to bear weight, but this has since gotten better. Notes she still has posterior pain, but denies locking. Denies recent injury, but has a history of a left patella dislocation in October 2022, for which we are following her for. In addition, reports last week having bilateral lower back pain which is a new problem. Denies radiating pain, numbness, tingling, or weakness. The pain is not waking her up at night.

## 2023-06-26 NOTE — DISCUSSION/SUMMARY
[de-identified] : Discussed at length the nature of the patient's condition. Patient's LEFT knee symptoms are patellofemoral in nature. Patient has not had any recurrent patella dislocations in over a year. I believe her locking and buckling symptoms may be due to quad weakness and her lower back symptoms. I reviewed her prior MRI results and there are no meniscal tears. I referred her to Dr. Guerrero for her sciatica like lower back symptoms. I also gave her a prescription for PT to focus on strengthening. \par \par Patient can continue home exercises, Advil/Aleve and activities as tolerated. All questions were answered, understanding verbalized. Patient is in agreement with plan of treatment.

## 2023-06-26 NOTE — PHYSICAL EXAM
[de-identified] : General appearance: well nourished and hydrated, pleasant, alert and oriented x 3, cooperative.\par HEENT: Normocephalic, EOM intact, Nasal septum midline, Oral cavity clear, External auditory canal clear.\par Cardiovascular: no apparent abnormalities, no lower leg edema, no varicosities, pedal pulses are palpable.\par Lymphatics Lymph nodes: none palpated, Lymphedema: not present.\par Neurologic: sensation is normal, no muscle weakness in upper or lower extremities, patella tendon reflexes intact .\par Dermatologic no apparent skin lesions, moist, warm, no rash.\par Spine:cervical spine appears normal and moves freely, thoracic spine appears normal and moves freely, lumbosacral spine appears normal and moves freely.\par Gait: nonantalgic.\par \par Left Knee\par Inspection: small popliteal cyst\par Wounds: none.\par Alignment: normal.\par Palpation: no specific tenderness on palpation.\par ROM: Active (in degrees): 0-125 with 10 degrees of hyperextension\par Ligamentous laxity (neg): all ligaments appear stable, negative ant. drawer test, negative post. drawer test, stable to varus stress test, stable to valgus stress test, negative Lachman's test, negative pivot shift test,\par Meniscal Test: negative McMurrays, negative Lino.\par Patellofemoral Alignment Test: Q angle-, normal. patella tracking central and normal qts angle.\par Muscle Test: good quad strength.\par Leg examination: calf is soft and non-tender. [de-identified] : Left knee x-rays, standing AP/Lateral and Merchant films, and 45 degree PA standing view, taken at the office today shows normal alignment, mild medial joint space narrowing, patella at appropriate height, g Kellgren and Corey grade 1.\par \par Right knee x-ray merchant view taken at the office today demonstrates good joint space and a well centered patella.

## 2023-06-26 NOTE — ADDENDUM
[FreeTextEntry1] : This note was written by MIROSLAVA ASHLEY on 06/26/2023 acting as scribe for Dr. Erich Gramajo M.D.\par \par I, Dr. Erich Gramajo, have read and attest that all the information, medical decision making and discharge instructions within are true and accurate. \par \par This note was written by Nicholas Littlejohn on 06/26/2023 acting as scribe for Dr. Erich Gramajo M.D.\par \par CHIVO, Dr. Erich Gramajo, have read and attest that all the information, medical decision making and discharge instructions within are true and accurate.

## 2023-07-10 ENCOUNTER — APPOINTMENT (OUTPATIENT)
Dept: ORTHOPEDIC SURGERY | Facility: CLINIC | Age: 63
End: 2023-07-10
Payer: COMMERCIAL

## 2023-07-10 VITALS — WEIGHT: 190 LBS | BODY MASS INDEX: 29.82 KG/M2 | HEIGHT: 67 IN

## 2023-07-10 PROCEDURE — 99215 OFFICE O/P EST HI 40 MIN: CPT

## 2023-07-10 PROCEDURE — 72100 X-RAY EXAM L-S SPINE 2/3 VWS: CPT

## 2023-07-27 ENCOUNTER — APPOINTMENT (OUTPATIENT)
Dept: ORTHOPEDIC SURGERY | Facility: CLINIC | Age: 63
End: 2023-07-27
Payer: COMMERCIAL

## 2023-07-27 ENCOUNTER — NON-APPOINTMENT (OUTPATIENT)
Age: 63
End: 2023-07-27

## 2023-07-27 DIAGNOSIS — M21.41 FLAT FOOT [PES PLANUS] (ACQUIRED), RIGHT FOOT: ICD-10-CM

## 2023-07-27 DIAGNOSIS — Z98.890 OTHER SPECIFIED POSTPROCEDURAL STATES: ICD-10-CM

## 2023-07-27 DIAGNOSIS — M76.822 POSTERIOR TIBIAL TENDINITIS, LEFT LEG: ICD-10-CM

## 2023-07-27 DIAGNOSIS — S82.892D OTHER FRACTURE OF LEFT LOWER LEG, SUBSEQUENT ENCOUNTER FOR CLOSED FRACTURE WITH ROUTINE HEALING: ICD-10-CM

## 2023-07-27 DIAGNOSIS — S99.921A UNSPECIFIED INJURY OF RIGHT FOOT, INITIAL ENCOUNTER: ICD-10-CM

## 2023-07-27 DIAGNOSIS — M21.42 FLAT FOOT [PES PLANUS] (ACQUIRED), RIGHT FOOT: ICD-10-CM

## 2023-07-27 PROCEDURE — 73610 X-RAY EXAM OF ANKLE: CPT | Mod: LT

## 2023-07-27 PROCEDURE — 73630 X-RAY EXAM OF FOOT: CPT | Mod: RT

## 2023-07-27 PROCEDURE — 99214 OFFICE O/P EST MOD 30 MIN: CPT

## 2023-08-31 ENCOUNTER — APPOINTMENT (OUTPATIENT)
Dept: ORTHOPEDIC SURGERY | Facility: CLINIC | Age: 63
End: 2023-08-31
Payer: COMMERCIAL

## 2023-08-31 VITALS — WEIGHT: 190 LBS | HEIGHT: 67 IN | BODY MASS INDEX: 29.82 KG/M2

## 2023-08-31 PROCEDURE — 99214 OFFICE O/P EST MOD 30 MIN: CPT

## 2023-09-22 ENCOUNTER — APPOINTMENT (OUTPATIENT)
Dept: GASTROENTEROLOGY | Facility: CLINIC | Age: 63
End: 2023-09-22

## 2023-10-16 ENCOUNTER — APPOINTMENT (OUTPATIENT)
Dept: ORTHOPEDIC SURGERY | Facility: CLINIC | Age: 63
End: 2023-10-16
Payer: COMMERCIAL

## 2023-10-16 PROCEDURE — 99214 OFFICE O/P EST MOD 30 MIN: CPT | Mod: 95

## 2024-03-28 ENCOUNTER — NON-APPOINTMENT (OUTPATIENT)
Age: 64
End: 2024-03-28

## 2024-04-04 ENCOUNTER — APPOINTMENT (OUTPATIENT)
Dept: ORTHOPEDIC SURGERY | Facility: CLINIC | Age: 64
End: 2024-04-04
Payer: COMMERCIAL

## 2024-04-04 VITALS — WEIGHT: 190 LBS | BODY MASS INDEX: 29.82 KG/M2 | HEIGHT: 67 IN

## 2024-04-04 DIAGNOSIS — M43.16 SPONDYLOLISTHESIS, LUMBAR REGION: ICD-10-CM

## 2024-04-04 DIAGNOSIS — M51.36 OTHER INTERVERTEBRAL DISC DEGENERATION, LUMBAR REGION: ICD-10-CM

## 2024-04-04 PROCEDURE — 99214 OFFICE O/P EST MOD 30 MIN: CPT

## 2024-04-04 RX ORDER — MELOXICAM 15 MG/1
15 TABLET ORAL
Qty: 30 | Refills: 1 | Status: ACTIVE | COMMUNITY
Start: 2023-08-31 | End: 1900-01-01

## 2024-04-04 RX ORDER — CYCLOBENZAPRINE HYDROCHLORIDE 5 MG/1
5 TABLET, FILM COATED ORAL
Qty: 30 | Refills: 1 | Status: ACTIVE | COMMUNITY
Start: 2023-08-31 | End: 1900-01-01

## 2025-01-13 ENCOUNTER — APPOINTMENT (OUTPATIENT)
Dept: ORTHOPEDIC SURGERY | Facility: CLINIC | Age: 65
End: 2025-01-13
Payer: COMMERCIAL

## 2025-01-13 VITALS — BODY MASS INDEX: 29.82 KG/M2 | WEIGHT: 190 LBS | HEIGHT: 67 IN

## 2025-01-13 DIAGNOSIS — M51.369: ICD-10-CM

## 2025-01-13 DIAGNOSIS — M43.16 SPONDYLOLISTHESIS, LUMBAR REGION: ICD-10-CM

## 2025-01-13 DIAGNOSIS — M51.26 OTHER INTERVERTEBRAL DISC DISPLACEMENT, LUMBAR REGION: ICD-10-CM

## 2025-01-13 DIAGNOSIS — M48.07 SPINAL STENOSIS, LUMBOSACRAL REGION: ICD-10-CM

## 2025-01-13 PROCEDURE — 99215 OFFICE O/P EST HI 40 MIN: CPT

## 2025-01-13 RX ORDER — MELOXICAM 15 MG/1
15 TABLET ORAL
Qty: 30 | Refills: 1 | Status: ACTIVE | COMMUNITY
Start: 2025-01-13 | End: 1900-01-01

## 2025-03-10 ENCOUNTER — APPOINTMENT (OUTPATIENT)
Dept: ORTHOPEDIC SURGERY | Facility: CLINIC | Age: 65
End: 2025-03-10
Payer: COMMERCIAL

## 2025-03-10 VITALS — BODY MASS INDEX: 29.82 KG/M2 | HEIGHT: 67 IN | WEIGHT: 190 LBS

## 2025-03-10 DIAGNOSIS — M51.26 OTHER INTERVERTEBRAL DISC DISPLACEMENT, LUMBAR REGION: ICD-10-CM

## 2025-03-10 PROCEDURE — 99214 OFFICE O/P EST MOD 30 MIN: CPT

## 2025-03-12 ENCOUNTER — NON-APPOINTMENT (OUTPATIENT)
Age: 65
End: 2025-03-12

## 2025-03-28 ENCOUNTER — APPOINTMENT (OUTPATIENT)
Dept: PHYSICAL MEDICINE AND REHAB | Facility: CLINIC | Age: 65
End: 2025-03-28
Payer: COMMERCIAL

## 2025-03-28 DIAGNOSIS — M48.07 SPINAL STENOSIS, LUMBOSACRAL REGION: ICD-10-CM

## 2025-03-28 DIAGNOSIS — M51.369: ICD-10-CM

## 2025-03-28 DIAGNOSIS — M54.16 RADICULOPATHY, LUMBAR REGION: ICD-10-CM

## 2025-03-28 DIAGNOSIS — M43.16 SPONDYLOLISTHESIS, LUMBAR REGION: ICD-10-CM

## 2025-03-28 PROCEDURE — 99204 OFFICE O/P NEW MOD 45 MIN: CPT

## 2025-03-28 RX ORDER — ALENDRONATE SODIUM 70 MG/1
70 TABLET ORAL
Refills: 0 | Status: ACTIVE | COMMUNITY

## 2025-03-31 ENCOUNTER — APPOINTMENT (OUTPATIENT)
Dept: ORTHOPEDIC SURGERY | Facility: CLINIC | Age: 65
End: 2025-03-31
Payer: COMMERCIAL

## 2025-03-31 VITALS — BODY MASS INDEX: 29.82 KG/M2 | HEIGHT: 67 IN | WEIGHT: 190 LBS

## 2025-03-31 PROBLEM — M54.16 LUMBAR RADICULOPATHY: Status: ACTIVE | Noted: 2025-03-31

## 2025-03-31 PROCEDURE — 99215 OFFICE O/P EST HI 40 MIN: CPT

## 2025-03-31 RX ORDER — GABAPENTIN 100 MG/1
100 CAPSULE ORAL
Qty: 90 | Refills: 1 | Status: ACTIVE | COMMUNITY
Start: 2025-03-31 | End: 1900-01-01

## 2025-03-31 RX ORDER — TIZANIDINE 4 MG/1
4 TABLET ORAL
Qty: 30 | Refills: 0 | Status: ACTIVE | COMMUNITY
Start: 2025-03-31 | End: 1900-01-01

## 2025-04-10 ENCOUNTER — OUTPATIENT (OUTPATIENT)
Dept: OUTPATIENT SERVICES | Facility: HOSPITAL | Age: 65
LOS: 1 days | End: 2025-04-10
Payer: COMMERCIAL

## 2025-04-10 ENCOUNTER — APPOINTMENT (OUTPATIENT)
Dept: PHYSICAL MEDICINE AND REHAB | Facility: CLINIC | Age: 65
End: 2025-04-10
Payer: COMMERCIAL

## 2025-04-10 DIAGNOSIS — M48.07 SPINAL STENOSIS, LUMBOSACRAL REGION: ICD-10-CM

## 2025-04-10 DIAGNOSIS — M51.26 OTHER INTERVERTEBRAL DISC DISPLACEMENT, LUMBAR REGION: ICD-10-CM

## 2025-04-10 DIAGNOSIS — Z98.890 OTHER SPECIFIED POSTPROCEDURAL STATES: Chronic | ICD-10-CM

## 2025-04-10 DIAGNOSIS — M54.16 RADICULOPATHY, LUMBAR REGION: ICD-10-CM

## 2025-04-10 DIAGNOSIS — Z98.891 HISTORY OF UTERINE SCAR FROM PREVIOUS SURGERY: Chronic | ICD-10-CM

## 2025-04-10 DIAGNOSIS — M54.17 RADICULOPATHY, LUMBOSACRAL REGION: ICD-10-CM

## 2025-04-10 DIAGNOSIS — M51.369: ICD-10-CM

## 2025-04-10 PROCEDURE — 62323 NJX INTERLAMINAR LMBR/SAC: CPT

## 2025-04-14 ENCOUNTER — APPOINTMENT (OUTPATIENT)
Dept: ORTHOPEDIC SURGERY | Facility: CLINIC | Age: 65
End: 2025-04-14
Payer: COMMERCIAL

## 2025-04-14 VITALS — WEIGHT: 195 LBS | BODY MASS INDEX: 30.61 KG/M2 | HEIGHT: 67 IN

## 2025-04-14 DIAGNOSIS — M17.0 BILATERAL PRIMARY OSTEOARTHRITIS OF KNEE: ICD-10-CM

## 2025-04-14 PROCEDURE — 73564 X-RAY EXAM KNEE 4 OR MORE: CPT | Mod: 50

## 2025-04-14 PROCEDURE — 99213 OFFICE O/P EST LOW 20 MIN: CPT

## 2025-04-14 PROCEDURE — G2211 COMPLEX E/M VISIT ADD ON: CPT | Mod: NC

## 2025-05-06 ENCOUNTER — APPOINTMENT (OUTPATIENT)
Dept: PHYSICAL MEDICINE AND REHAB | Facility: CLINIC | Age: 65
End: 2025-05-06
Payer: COMMERCIAL

## 2025-05-06 DIAGNOSIS — M54.16 RADICULOPATHY, LUMBAR REGION: ICD-10-CM

## 2025-05-06 DIAGNOSIS — M43.16 SPONDYLOLISTHESIS, LUMBAR REGION: ICD-10-CM

## 2025-05-06 PROCEDURE — 99214 OFFICE O/P EST MOD 30 MIN: CPT

## 2025-05-12 ENCOUNTER — APPOINTMENT (OUTPATIENT)
Dept: ORTHOPEDIC SURGERY | Facility: CLINIC | Age: 65
End: 2025-05-12
Payer: COMMERCIAL

## 2025-05-12 VITALS — WEIGHT: 195 LBS | BODY MASS INDEX: 30.61 KG/M2 | HEIGHT: 67 IN

## 2025-05-12 DIAGNOSIS — M51.26 OTHER INTERVERTEBRAL DISC DISPLACEMENT, LUMBAR REGION: ICD-10-CM

## 2025-05-12 DIAGNOSIS — M48.07 SPINAL STENOSIS, LUMBOSACRAL REGION: ICD-10-CM

## 2025-05-12 PROCEDURE — 99214 OFFICE O/P EST MOD 30 MIN: CPT

## 2025-05-14 ENCOUNTER — APPOINTMENT (OUTPATIENT)
Dept: ORTHOPEDIC SURGERY | Facility: CLINIC | Age: 65
End: 2025-05-14
Payer: COMMERCIAL

## 2025-05-14 VITALS — WEIGHT: 195 LBS | BODY MASS INDEX: 30.61 KG/M2 | HEIGHT: 67 IN

## 2025-05-14 DIAGNOSIS — M25.511 PAIN IN RIGHT SHOULDER: ICD-10-CM

## 2025-05-14 PROCEDURE — 99213 OFFICE O/P EST LOW 20 MIN: CPT

## 2025-05-14 PROCEDURE — 73030 X-RAY EXAM OF SHOULDER: CPT | Mod: RT

## 2025-06-25 ENCOUNTER — APPOINTMENT (OUTPATIENT)
Dept: ORTHOPEDIC SURGERY | Facility: CLINIC | Age: 65
End: 2025-06-25
Payer: COMMERCIAL

## 2025-06-25 VITALS — BODY MASS INDEX: 30.61 KG/M2 | HEIGHT: 67 IN | WEIGHT: 195 LBS

## 2025-06-25 PROCEDURE — 73564 X-RAY EXAM KNEE 4 OR MORE: CPT | Mod: 50

## 2025-06-25 PROCEDURE — 99213 OFFICE O/P EST LOW 20 MIN: CPT

## 2025-06-25 PROCEDURE — G2211 COMPLEX E/M VISIT ADD ON: CPT | Mod: NC

## 2025-08-27 ENCOUNTER — APPOINTMENT (OUTPATIENT)
Dept: PHYSICAL MEDICINE AND REHAB | Facility: CLINIC | Age: 65
End: 2025-08-27
Payer: COMMERCIAL

## 2025-08-27 DIAGNOSIS — M54.16 RADICULOPATHY, LUMBAR REGION: ICD-10-CM

## 2025-08-27 DIAGNOSIS — M51.369: ICD-10-CM

## 2025-08-27 DIAGNOSIS — M51.26 OTHER INTERVERTEBRAL DISC DISPLACEMENT, LUMBAR REGION: ICD-10-CM

## 2025-08-27 DIAGNOSIS — M48.07 SPINAL STENOSIS, LUMBOSACRAL REGION: ICD-10-CM

## 2025-08-27 PROCEDURE — 99214 OFFICE O/P EST MOD 30 MIN: CPT

## 2025-09-04 ENCOUNTER — NON-APPOINTMENT (OUTPATIENT)
Age: 65
End: 2025-09-04

## 2025-09-04 ENCOUNTER — APPOINTMENT (OUTPATIENT)
Dept: OBGYN | Facility: CLINIC | Age: 65
End: 2025-09-04
Payer: COMMERCIAL

## 2025-09-04 VITALS
HEIGHT: 67 IN | BODY MASS INDEX: 30.61 KG/M2 | WEIGHT: 195 LBS | DIASTOLIC BLOOD PRESSURE: 72 MMHG | SYSTOLIC BLOOD PRESSURE: 119 MMHG

## 2025-09-04 DIAGNOSIS — Z01.419 ENCOUNTER FOR GYNECOLOGICAL EXAMINATION (GENERAL) (ROUTINE) W/OUT ABNORMAL FINDINGS: ICD-10-CM

## 2025-09-04 PROCEDURE — 99459 PELVIC EXAMINATION: CPT | Mod: NC

## 2025-09-04 PROCEDURE — 99386 PREV VISIT NEW AGE 40-64: CPT

## 2025-09-04 RX ORDER — VALACYCLOVIR 500 MG/1
500 TABLET, FILM COATED ORAL
Qty: 60 | Refills: 1 | Status: ACTIVE | COMMUNITY
Start: 2025-09-04 | End: 1900-01-01

## 2025-09-04 RX ORDER — ESTRADIOL 0.1 MG/G
0.1 CREAM VAGINAL
Qty: 3 | Refills: 2 | Status: ACTIVE | COMMUNITY
Start: 2025-09-04 | End: 1900-01-01

## 2025-09-05 LAB — HPV HIGH+LOW RISK DNA PNL CVX: NOT DETECTED

## 2025-09-08 LAB — CYTOLOGY CVX/VAG DOC THIN PREP: ABNORMAL

## 2025-09-15 ENCOUNTER — RX RENEWAL (OUTPATIENT)
Age: 65
End: 2025-09-15

## (undated) DEVICE — SOL IRR POUR NS 0.9% 1500ML

## (undated) DEVICE — ELCTR GROUNDING PAD ADULT COVIDIEN

## (undated) DEVICE — DRSG KLING 4"

## (undated) DEVICE — DRSG COBAN 4"

## (undated) DEVICE — SUT VICRYL 0 27" OS-6 UNDYED

## (undated) DEVICE — LIJ-SYNTHES LOCKING SMALL FRAGMENT (REQUIRES BILL) (IMPLANT): Type: DURABLE MEDICAL EQUIPMENT

## (undated) DEVICE — STAPLER SKIN VISI-STAT 35 WIDE

## (undated) DEVICE — SOL IRR POUR H2O 1500ML

## (undated) DEVICE — DRSG WEBRIL 3"

## (undated) DEVICE — DRILL BIT WRIGHT MEDICAL 2.5MM

## (undated) DEVICE — SUT VICRYL 2-0 27" FS-1 UNDYED

## (undated) DEVICE — PREP CHLORAPREP HI-LITE ORANGE 26ML

## (undated) DEVICE — WARMING BLANKET UPPER ADULT

## (undated) DEVICE — CANISTER DISPOSABLE THIN WALL 3000CC

## (undated) DEVICE — DRSG ACE BANDAGE 6"

## (undated) DEVICE — DRAPE SURGICAL #1010

## (undated) DEVICE — PACK LIJ BASIC ORTHO

## (undated) DEVICE — DRILL BIT WRIGHT MEDICAL CANN 3.5MM

## (undated) DEVICE — LABELS BLANK W PEN

## (undated) DEVICE — DRSG CURITY GAUZE SPONGE 4 X 4" 12-PLY

## (undated) DEVICE — POSITIONER STRAP ARMBOARD VELCRO TS-30

## (undated) DEVICE — TOURNIQUET CUFF 34" DUAL PORT W PLC

## (undated) DEVICE — TOURNIQUET ESMARK 6"

## (undated) DEVICE — SUT NYLON 3-0 18" PS-2

## (undated) DEVICE — DRILL BIT WRIGHT MEDICAL CANN 2.8MM

## (undated) DEVICE — PREP SCRUB BRUSH W CHG 4%

## (undated) DEVICE — VENODYNE/SCD SLEEVE CALF MEDIUM

## (undated) DEVICE — SUT MONOCRYL 4-0 27" PS-2 UNDYED

## (undated) DEVICE — DRAPE IOBAN 33" X 23"

## (undated) DEVICE — DRAPE U POLY BLUE 60"X60"

## (undated) DEVICE — FRAZIER SUCTION TIP 8FR

## (undated) DEVICE — LAP PAD W RING 18 X 18"

## (undated) DEVICE — DRAPE COVER SNAP 36X30"